# Patient Record
Sex: FEMALE | Race: WHITE | URBAN - METROPOLITAN AREA
[De-identification: names, ages, dates, MRNs, and addresses within clinical notes are randomized per-mention and may not be internally consistent; named-entity substitution may affect disease eponyms.]

---

## 2020-01-06 ENCOUNTER — OFFICE VISIT (OUTPATIENT)
Dept: ORTHOPEDIC SURGERY | Age: 45
End: 2020-01-06
Payer: COMMERCIAL

## 2020-01-06 VITALS — BODY MASS INDEX: 24.16 KG/M2 | HEIGHT: 65 IN | WEIGHT: 145 LBS

## 2020-01-06 PROCEDURE — 99243 OFF/OP CNSLTJ NEW/EST LOW 30: CPT | Performed by: FAMILY MEDICINE

## 2020-01-06 PROCEDURE — L0625 LO FLEX L1-BELOW L5 PRE OTS: HCPCS | Performed by: FAMILY MEDICINE

## 2020-01-06 RX ORDER — MELOXICAM 15 MG/1
15 TABLET ORAL DAILY
Qty: 30 TABLET | Refills: 3 | Status: SHIPPED | OUTPATIENT
Start: 2020-01-06

## 2020-01-06 NOTE — PROGRESS NOTES
Chief Complaint  Back Pain (Lumbar)      Neel Alcantar is a 45 y.o. female very pleasant white female who previous to the onset of her symptoms in summer 2019 typically ran about 10 to 15 miles per week and has done multiple half marathons but is currently doing elliptical and is being seen today for evaluation of episodic mechanical right-sided lower lumbar back discomfort and consultation from Dr. Yamel Tom    History of Present Illness for New Patient:     Patient is being seen today for acute right sided low back pain. The patient reports her first incident was this summer while [place] 60 [place] while boating on [facility]. She does recall she was not in a good position when she began to lift and does remember having a sudden sharp pain that caused her to fall at that time. She was able to rest, ice and use an NSAID that she felt was helpful after a few days. She has had 2 other incidents similar to this most recently this past December possibly from having to lift and carry luggage while on vacation. She did contact her PCP Dr. Yamel Tom of the at that time and was given a muscle relaxer and oral steroid which helped significantly. She describes the symptoms as sharp and tingling. The pain is located more right sided low back with some radicular symptoms into her buttock and leg. The patient rates their current pain as a 0 out of 10 on the pain scale. This problem has been an issue on and off for a few months. The problem is worse after lifting heavier objects. Patient has attempted rest, ice, NSAID, muscle relaxant, oral steroid, and stretching in yoga and pillates to treat this problem and symptoms are are improving. Patient does not have a previous history of previous back issues. Patient is being seen today for orthopedic and sports consultation and review of imaging.      Pain Assessment  Location of Pain: Back  Location Modifiers: central or lateral gluteal discomfort. No notch tenderness. No lateral or anterior hip discomfort. Rang of Motion: She does have reasonable flexion to about 60-70. She does have some limitations in extension with 5 out of 10 pain reproduced with extension to the right and 3 out of 10 on the left. Lateral bending rotation diminished by about 25%. Strength: There is not appear to be focal lower extremity motor deficits. Special Tests: Negative straight leg raising bilaterally. Pain most prominent with facet loading. Screening cervical testing. Skin: There are no rashes, ulcerations or lesions. Distal motor sensory and vascular exam is intact. Gait: Fluid smooth gait       Reflex symmetrically preserved      Additional Comments:             Additional Examinations:     Right Lower Extremity: Examination of the right lower extremity does not show any tenderness, deformity or injury. Range of motion is unremarkable. There is no gross instability. There are no rashes, ulcerations or lesions. Strength and tone are normal.  Left Lower Extremity: Examination of the left lower extremity does not show any tenderness, deformity or injury. Range of motion is unremarkable. There is no gross instability. There are no rashes, ulcerations or lesions. Strength and tone are normal.  Neck: Examination of the neck does not show any tenderness, deformity or injury. Range of motion is unremarkable. There is no gross instability. There are no rashes, ulcerations or lesions. Strength and tone are normal.        Diagnostic Test Findings: Xr Lumbar Spine (min 4 Views)    Result Date: 1/6/2020  Radiology exam is complete. No Radiologist dictation. Please follow up with ordering provider. Lumbar spine AP lateral obliques x2 were obtained today and does show mild lower lumbar degenerative disc disease with facet arthropathy with a low-grade listhesis at L5-S1.       Assessment & Plan:    Encounter Diagnoses Name Primary?  Spine pain Yes    Lumbar spondylolysis     Acute right-sided low back pain without sciatica     DDD (degenerative disc disease), lumbar     Lumbar facet arthropathy        Orders Placed This Encounter   Procedures    XR LUMBAR SPINE (MIN 4 VIEWS)     Standing Status:   Future     Number of Occurrences:   1     Standing Expiration Date:   2/6/2020    Ambulatory referral to Physical Therapy     Referral Priority:   Routine     Referral Type:   Eval and Treat     Referral Reason:   Specialty Services Required     Requested Specialty:   Physical Therapy     Number of Visits Requested:   1    Bird and Carline Extensor Lumbosacral Support Brace (Warm and Form)     Patient was prescribed a Bird and Carline Extensor Lumbosacral Support Brace with a pocket. This orthosis is required for the following reasons:    Reduce pain by restricting mobility of the trunk  Facilitate healing following an injury to the spine or related soft tissues  Support weak spinal muscles    The patient was educated and fit by a healthcare professional with expert knowledge and specialization in brace application while under the direct supervision of the treating physician. Verbal and written instructions for the use of and application of this item were provided. They were instructed to contact the office immediately should the brace result in increased pain, decreased sensation, increased swelling or worsening of the condition. Treatment Plan:  Treatment options were discussed Elbert Pittman. We did review her plain films and exam findings. She has had episodic pain for the past 6+ months in her lower back from a lifting injury. She does have some underlying lumbar facet arthropathy and I suspect some mild degenerative disc changes but is not complaining of high-grade radicular symptoms.   Since finishing up her Medrol pack a couple of weeks ago, her back pain symptoms have improved substantially although we did place her on meloxicam 15 mg daily. We did give her a warm-and-form belt. I would like for her to attend physical therapy formally for core strengthening. Potential for imaging was discussed however we will try initial conservative treatment. We will see her back in a few weeks for follow-up. She will contact us in the interim with questions or concerns. She may continue to exercise and do the elliptical based on pain. CC: Dr. Jaylene Cruz    This dictation was performed with a verbal recognition program [facility]) and it was checked for errors. It is possible that there are still dictated errors within this office note. If so, please bring any errors to my attention for an addendum. All efforts were made to ensure that this office note is accurate.

## 2020-01-10 ENCOUNTER — HOSPITAL ENCOUNTER (OUTPATIENT)
Dept: PHYSICAL THERAPY | Age: 45
Setting detail: THERAPIES SERIES
Discharge: HOME | End: 2020-01-10
Payer: COMMERCIAL

## 2020-01-10 PROCEDURE — 97161 PT EVAL LOW COMPLEX 20 MIN: CPT | Performed by: PHYSICAL THERAPIST

## 2020-01-10 PROCEDURE — 97112 NEUROMUSCULAR REEDUCATION: CPT | Performed by: PHYSICAL THERAPIST

## 2020-01-10 PROCEDURE — 97110 THERAPEUTIC EXERCISES: CPT | Performed by: PHYSICAL THERAPIST

## 2020-01-10 NOTE — FLOWSHEET NOTE
(81332)                                          NMR re-education (88419)   CUES NEEDED   Pt education: dx, px, POC, role of PT, activity modification, directional preference, centralization of sx's, x-ray findings, biomechanics of running, return to run program, impact tolerance, cross training, flexibility/ strength requirements, recruitment patterns, HEP 15 mins                       TA iso  +march  +supine clam   H5   15 B  15 B Requires VC/ TC for TA recruitment                           Therapeutic Activity (41909)                                              Therapeutic Exercise and NMR EXR  [x] (13181) Provided verbal/tactile cueing for activities related to strengthening, flexibility, endurance, ROM  for improvements in proximal hip and core control with self care, mobility, lifting and ambulation. [x] (31255) Provided verbal/tactile cueing for activities related to improving balance, coordination, kinesthetic sense, posture, motor skill, proprioception  to assist with core control in self care, mobility, lifting, and ambulation.      Therapeutic Activities:    [] (22137 or 22722) Provided verbal/tactile cueing for activities related to improving balance, coordination, kinesthetic sense, posture, motor skill, proprioception and motor activation to allow for proper function  with self care and ADLs  [] (94544) Provided training and instruction to the patient for proper core and proximal hip recruitment and positioning with ambulation re-education     Home Exercise Program:    [x] (72782) Reviewed/Progressed HEP activities related to strengthening, flexibility, endurance, ROM of core, proximal hip and LE for functional self-care, mobility, lifting and ambulation   [] (33412) Reviewed/Progressed HEP activities related to improving balance, coordination, kinesthetic sense, posture, motor skill, proprioception of core, proximal hip and LE for self care, mobility, lifting, and ambulation      Manual Treatments: PROM / STM / Oscillations-Mobs:  G-I, II, III, IV (PA's, Inf., Post.)  [x] (49661) Provided manual therapy to mobilize proximal hip and LS spine soft tissue/joints for the purpose of modulating pain, promoting relaxation,  increasing ROM, reducing/eliminating soft tissue swelling/inflammation/restriction, improving soft tissue extensibility and allowing for proper ROM for normal function with self care, mobility, lifting and ambulation. Modalities:  none      Charges:  Timed Code Treatment Minutes: 40   Total Treatment Minutes: 75     BWC time in/time out:   (will also require time and out per CPT code below)    [x] EVAL (LOW) 65523 (typically 20 minutes face-to-face)  [] EVAL (MOD) 24921 (typically 30 minutes face-to-face)  [] EVAL (HIGH) 50839 (typically 45 minutes face-to-face)  [] RE-EVAL     [x] YP(06748) x 1    [] IONTO  [x] NMR (42159) x 2    [] VASO  [] Manual (93850) x      [] Other:  [] TA x      [] Mech Traction (34655)  [] ES(attended) (88690)      [] ES (un) (34659):     GOALS:  Patient stated goal: return to impact. []? Progressing: []? Met: []? Not Met: []? Adjusted     Therapist goals for Patient:   Short Term Goals: To be achieved in: 2 weeks  1. Independent in HEP and progression per patient tolerance, in order to prevent re-injury. []? Progressing: []? Met: []? Not Met: []? Adjusted  2. Patient will have a decrease in pain to facilitate improvement in movement, function, and ADLs as indicated by Functional Deficits. []? Progressing: []? Met: []? Not Met: []? Adjusted        Long Term Goals: To be achieved in: 8 weeks  1. Disability index score of 0% or less for the Mod Oswestry  to assist with reaching prior level of function. []? Progressing: []? Met: []? Not Met: []? Adjusted  2. Patient will demonstrate increased AROM to WNL, good LS mobility, good hip ROM to allow for proper joint functioning as indicated by patients Functional Deficits. []? Progressing: []? Met: []?  Not Met: []? Adjusted  3. Patient will demonstrate an increase in Strength to good proximal hip and core activation to allow for proper functional mobility as indicated by patients Functional Deficits. []? Progressing: []? Met: []? Not Met: []? Adjusted  4. Patient will return to all functional activities without increased symptoms or restriction. []? Progressing: []? Met: []? Not Met: []? Adjusted  5. Pt will be prepared to engage in regular impact without radicular sx's or limitations. (patient specific functional goal)    []? Progressing: []? Met: []? Not Met: []? Adjusted            Progression Towards Functional goals:  [] Patient is progressing as expected towards functional goals listed. [] Progression is slowed due to complexities listed. [] Progression has been slowed due to co-morbidities. [x] Plan just implemented, too soon to assess goals progression  [] Other:     Overall Progression Towards Functional goals/ Treatment Progress Update:  [] Patient is progressing as expected towards functional goals listed. [] Progression is slowed due to complexities/Impairments listed. [] Progression has been slowed due to co-morbidities.   [x] Plan just implemented, too soon to assess goals progression <30days   [] Goals require adjustment due to lack of progress  [] Patient is not progressing as expected and requires additional follow up with physician  [] Other    Prognosis for POC: [x] Good [] Fair  [] Poor      Patient requires continued skilled intervention: [x] Yes  [] No    Treatment/Activity Tolerance:  [x] Patient able to complete treatment  [] Patient limited by fatigue  [] Patient limited by pain    [] Patient limited by other medical complications  [] Other:     ASSESSMENT: see eval    Return to Play: (if applicable)   [x]  Stage 1: Intro to Strength   []  Stage 2: Return to Run and Strength   []  Stage 3: Return to Jump and Strength   []  Stage 4: Dynamic Strength and Agility   []  Stage 5: Sport Specific Training     []  Ready to Return to Play, Meets All Above Stages   []  Not Ready for Return to Sports   Comments:                               PLAN: See eval  [] Continue per plan of care [] Alter current plan (see comments above)  [x] Plan of care initiated [] Hold pending MD visit [] Discharge      Electronically signed by:  Malinda Medeiros, PT [id number]    Note: If patient does not return for scheduled/ recommended follow up visits, this note will serve as a discharge from care along with most recent update on progress.

## 2020-01-10 NOTE — PLAN OF CARE
[facility], [address]  Phone: [phone]  Fax [phone]    Physical Therapy Certification    Dear Referring Practitioner: Ambrose Montiel,    We had the pleasure of evaluating the following patient for physical therapy services at [address] [place] [address]. A summary of our findings can be found in the initial assessment below. This includes our plan of care. If you have any questions or concerns regarding these findings, please do not hesitate to contact me at the office phone number checked above. Thank you for the referral.       Physician Signature:_______________________________Date:__________________  By signing above (or electronic signature), therapists plan is approved by physician    Patient: Steve Ledesma   : 1975   MRN: [id number]  Referring Physician: Referring Practitioner: Ambrose Montiel      Evaluation Date: 1/10/2020      Medical Diagnosis Information:  Diagnosis: M54.9 (ICD-10-CM) - Spine pain, M43.06 (ICD-10-CM) - Lumbar spondylolysis, M54.5 (ICD-10-CM) - Acute right-sided low back pain without sciatica, M51.36 (ICD-10-CM) - DDD (degenerative disc disease), lumbar, M47.816 (ICD-10-CM) - Lumbar facet arthropathy   Treatment Diagnosis: M54.9 (ICD-10-CM) - Spine pain                                         Insurance information: PT Insurance Information:  BCBS - $40CP-100%-PT/OT MED NEC - NO AUTH       Precautions/ Contra-indications: none  Latex Allergy:  [x]NO      []YES  Preferred Language for Healthcare:   [x]English       []other:    SUBJECTIVE: Patient stated complaint: Pt had episode of LBP with radiculopathy- relieved with steroids. Date of initial injury was likely in the summer lifting son into boat with poor lifting mechanics. She noted immediate back pain where her mobility was limited. She noted relief with anti-inflammatories.  She has had a few incidences of severe being higher risk   TUG score (>12s at risk):     [] Falls education provided, including         ASSESSMENT:   Functional Impairments:     []Noted lumbar/proximal hip hypomobility   [x]Noted lumbosacral and/or generalized hypermobility   [x]Decreased Lumbosacral/hip/LE functional ROM   [x]Decreased core/proximal hip strength and neuromuscular control    []Decreased LE functional strength    []Abnormal reflexes/sensation/myotomal/dermatomal deficits  []Reduced balance/proprioceptive control    []other:      Functional Activity Limitations (from functional questionnaire and intake)   [x]Reduced ability to tolerate prolonged functional positions   []Reduced ability or difficulty with changes of positions or transfers between positions   [x]Reduced ability to maintain good posture and demonstrate good body mechanics with sitting, bending, and lifting   []Reduced ability to sleep   [] Reduced ability or tolerance with driving and/or computer work   [x]Reduced ability to perform lifting, reaching, carrying tasks   [x]Reduced ability to squat   []Reduced ability to forward bend   [x]Reduced ability to ambulate prolonged functional periods/distances/surfaces   [x]Reduced ability to ascend/descend stairs   []other:       Participation Restrictions   [x]Reduced participation in self care activities   []Reduced participation in home management activities   []Reduced participation in work activities   []Reduced participation in social activities. [x]Reduced participation in sport/recreation activities. Classification:   [x]Signs/symptoms consistent with Lumbar instability/stabilization subgroup. []Signs/symptoms consistent with Lumbar mobilization/manipulation subgroup, myotomes and dermatomes intact. Meets manipulation criteria.     []Signs/symptoms consistent with Lumbar direction specific/centralization subgroup   []Signs/symptoms consistent with Lumbar traction subgroup     []Signs/symptoms consistent with lumbar facet dysfunction   []Signs/symptoms consistent with lumbar stenosis type dysfunction   []Signs/symptoms consistent with nerve root involvement including myotome & dermatome dysfunction   []Signs/symptoms consistent with post-surgical status including: decreased ROM, strength and function. []signs/symptoms consistent with pathology which may benefit from Dry needling     []other:      Prognosis/Rehab Potential:      []Excellent   [x]Good    []Fair   []Poor    Tolerance of evaluation/treatment:    []Excellent   [x]Good    []Fair   []Poor  Physical Therapy Evaluation Complexity Justification  [x] A history of present problem with:  [] no personal factors and/or comorbidities that impact the plan of care;  [x]1-2 personal factors and/or comorbidities that impact the plan of care  []3 personal factors and/or comorbidities that impact the plan of care  [x] An examination of body systems using standardized tests and measures addressing any of the following: body structures and functions (impairments), activity limitations, and/or participation restrictions;:  [] a total of 1-2 or more elements   [] a total of 3 or more elements   [x] a total of 4 or more elements   [x] A clinical presentation with:  [x] stable and/or uncomplicated characteristics   [] evolving clinical presentation with changing characteristics  [] unstable and unpredictable characteristics;   [x] Clinical decision making of [x] low, [] moderate, [] high complexity using standardized patient assessment instrument and/or measurable assessment of functional outcome.     [x] EVAL (LOW) 44530 (typically 20 minutes face-to-face)  [] EVAL (MOD) 19169 (typically 30 minutes face-to-face)  [] EVAL (HIGH) 62439 (typically 45 minutes face-to-face)  [] RE-EVAL         PLAN: Begin PT focusing on: proximal hip mobilizations, LB mobs, LB core activation, proximal hip activation, and HEP    Frequency/Duration:  1-2 days per week for 6-8 Weeks:  Interventions:  [x] Therapeutic exercise including: strength training, ROM, for LE, Glutes and core   [x]  NMR activation and proprioception for glutes , LE and Core   [x]  Manual therapy as indicated for Hip complex, LE and spine to include: Dry Needling/IASTM, STM, PROM, Gr I-IV mobilizations, manipulation. [x]  Modalities as needed that may include: thermal agents, E-stim, Biofeedback, US, iontophoresis as indicated  [x]  Patient education on joint protection, postural re-education, activity modification, progression of HEP. HEP instruction: (see scanned forms)    GOALS:  Patient stated goal: return to impact. [] Progressing: [] Met: [] Not Met: [] Adjusted    Therapist goals for Patient:   Short Term Goals: To be achieved in: 2 weeks  1. Independent in HEP and progression per patient tolerance, in order to prevent re-injury. [] Progressing: [] Met: [] Not Met: [] Adjusted  2. Patient will have a decrease in pain to facilitate improvement in movement, function, and ADLs as indicated by Functional Deficits. [] Progressing: [] Met: [] Not Met: [] Adjusted      Long Term Goals: To be achieved in: 8 weeks  1. Disability index score of 0% or less for the Mod Oswestry  to assist with reaching prior level of function. [] Progressing: [] Met: [] Not Met: [] Adjusted  2. Patient will demonstrate increased AROM to WNL, good LS mobility, good hip ROM to allow for proper joint functioning as indicated by patients Functional Deficits. [] Progressing: [] Met: [] Not Met: [] Adjusted  3. Patient will demonstrate an increase in Strength to good proximal hip and core activation to allow for proper functional mobility as indicated by patients Functional Deficits. [] Progressing: [] Met: [] Not Met: [] Adjusted  4. Patient will return to all functional activities without increased symptoms or restriction. [] Progressing: [] Met: [] Not Met: [] Adjusted  5.  Pt will be prepared to engage in regular impact without radicular sx's or limitations. (patient specific functional goal)    [] Progressing: [] Met: [] Not Met: [] Adjusted       Electronically signed by:  Maureen Srivastava, [facility]

## 2020-01-24 ENCOUNTER — HOSPITAL ENCOUNTER (OUTPATIENT)
Dept: PHYSICAL THERAPY | Age: 45
Setting detail: THERAPIES SERIES
Discharge: HOME | End: 2020-01-24
Payer: COMMERCIAL

## 2020-01-24 PROCEDURE — 97110 THERAPEUTIC EXERCISES: CPT | Performed by: PHYSICAL THERAPIST

## 2020-01-24 PROCEDURE — 97112 NEUROMUSCULAR REEDUCATION: CPT | Performed by: PHYSICAL THERAPIST

## 2020-01-24 NOTE — FLOWSHEET NOTE
[facility],  [address]  Phone: [phone]  Fax [phone]    Physical Therapy Treatment Note/ Progress Report:           Date:  2020    Patient Name:  Danielle Jim    :  1975  MRN: [id number]  Restrictions/Precautions:    Medical/Treatment Diagnosis Information:  · Diagnosis: M54.9 (ICD-10-CM) - Spine pain, M43.06 (ICD-10-CM) - Lumbar spondylolysis, M54.5 (ICD-10-CM) - Acute right-sided low back pain without sciatica, M51.36 (ICD-10-CM) - DDD (degenerative disc disease), lumbar, M47.816 (ICD-10-CM) - Lumbar facet arthropathy  · Treatment Diagnosis: M54.9 (ICD-10-CM) - Spine pain  Insurance/Certification information:  PT Insurance Information:  BCBS - $40CP-100%-PT/OT MED NEC - NO AUTH  Physician Information:  Referring Practitioner: Mili Guerrier  Has the plan of care been signed (Y/N):        []  Yes  [x]  No     Date of Patient follow up with Physician: PRN      Is this a Progress Report:     []  Yes  [x]  No        If Yes:  Date Range for reporting period:  [id number]  Ending 20    Progress report will be due (10 Rx or 30 days whichever is less): see above       Recertification will be due (POC Duration  / 90 days whichever is less): 8 weeks         Visit # Insurance Allowable Auth Required   2 BMN []  Yes []  No        Functional Scale: Mod Oswestry 2%    Date assessed:  1-      Latex Allergy:  [x]NO      []YES  Preferred Language for Healthcare:   [x]English       []other:      Pain level:  0/10    SUBJECTIVE:  Been doing the walking program (up to 30 mins) and has been doing the elliptical without sx's or limitations.      OBJECTIVE:    Observation:    Test measurements:      RESTRICTIONS/PRECAUTIONS: h/o C section x 2; flexion bias; runner    Exercises/Interventions:     Therapeutic Ex (26325) Sets/sec Reps Notes/CUES   Retrolooping at wall  10 ea  hep   Supine sciatic Nn

## 2020-01-31 ENCOUNTER — APPOINTMENT (OUTPATIENT)
Dept: PHYSICAL THERAPY | Age: 45
End: 2020-01-31
Payer: COMMERCIAL

## 2020-02-07 ENCOUNTER — APPOINTMENT (OUTPATIENT)
Dept: PHYSICAL THERAPY | Age: 45
End: 2020-02-07
Payer: COMMERCIAL

## 2020-02-14 ENCOUNTER — HOSPITAL ENCOUNTER (OUTPATIENT)
Dept: PHYSICAL THERAPY | Age: 45
Setting detail: THERAPIES SERIES
Discharge: HOME | End: 2020-02-14
Payer: COMMERCIAL

## 2020-02-14 PROCEDURE — 97110 THERAPEUTIC EXERCISES: CPT | Performed by: PHYSICAL THERAPIST

## 2020-02-14 PROCEDURE — 97112 NEUROMUSCULAR REEDUCATION: CPT | Performed by: PHYSICAL THERAPIST

## 2020-02-14 NOTE — FLOWSHEET NOTE
[facility], [address] [place] [address] Ramesh  Phone: [phone]  Fax [phone]    ATHLETIC TRAINING AREA ACTIVITY SHEET  Date:  2020    Patient Name:  Reanna Del Valle    :  1975  MRN: [id number]  Restrictions/Precautions:    Medical/Treatment Diagnosis Information:  ·  Lumbar spondylolysis, acute R-sided LBP w/out sciatica, DDD, lumbar facet arthropathy  ·    Physician Information:   Michaela                                 Activity Log                                                               DOS/DOI:                                                             Date: 20   ATC Communication: runner    Bike    Airdyne    Elliptical    Treadmill        Hamstring stretch    Quad stretch    Hip flexor stretch    Piriformis stretch    Gastroc stretch    Soleus stretch        Reformer // Heels/Toes 1R1B x10                   Wide Heels/Toes 1R1B x10                   BKFO 1/2 way out 1R1B GVL 10x3\"                   Bridges 1/2 way out 2R 10x3\"                   AS 100s 1R 4x20\"                         I,Y,T 1R x10                   LS Ext 1R1B x10                         Circles  1R1B x10                   Donkey Kicks 1R1B R/L x10                   Standing Abd/Add 2R/1B R/L x10       Leg press  Bilat. Unilat. Knee ext. Knee flex. Squats   Mini                   Wall                   [facility]        Step   Up               Up and Over               Lat. Down                   [facility]  Flex. ABd              ADd              Ext        Cable Column/Theraband    Rows                                                  Ext. Lat. pulldown                                                  Chops                                                  Trunk Rot.         Modality declined   Initials DTM   Time spent one on one (workers comp)    Time spent with PT assistant

## 2020-02-14 NOTE — FLOWSHEET NOTE
[facility], [address]  Phone: [phone]  Fax [phone]    Physical Therapy Treatment Note/ Progress Report:           Date:  2020    Patient Name:  Corinne Siddiqi    :  1975  MRN: [id number]  Restrictions/Precautions:    Medical/Treatment Diagnosis Information:  · Diagnosis: M54.9 (ICD-10-CM) - Spine pain, M43.06 (ICD-10-CM) - Lumbar spondylolysis, M54.5 (ICD-10-CM) - Acute right-sided low back pain without sciatica, M51.36 (ICD-10-CM) - DDD (degenerative disc disease), lumbar, M47.816 (ICD-10-CM) - Lumbar facet arthropathy  · Treatment Diagnosis: M54.9 (ICD-10-CM) - Spine pain  Insurance/Certification information:  PT Insurance Information:  BCBS - $40CP-100%-PT/OT MED NEC - NO AUTH  Physician Information:  Referring Practitioner: Esteban Barcenas  Has the plan of care been signed (Y/N):        []  Yes  [x]  No     Date of Patient follow up with Physician: PRN      Is this a Progress Report:     []  Yes  [x]  No        If Yes:  Date Range for reporting period:  [id number]  Ending 20    Progress report will be due (10 Rx or 30 days whichever is less): see above       Recertification will be due (POC Duration  / 90 days whichever is less): 8 weeks         Visit # Insurance Allowable Auth Required   3 BMN []  Yes []  No        Functional Scale: Mod Oswestry 2%    Date assessed:  1-      Latex Allergy:  [x]NO      []YES  Preferred Language for Healthcare:   [x]English       []other:      Pain level:  0/10    SUBJECTIVE:  Been doing the interval program without issue. Notes TA more during exercise.      OBJECTIVE:    Observation:    Test measurements:      RESTRICTIONS/PRECAUTIONS: h/o C section x 2; flexion bias; runner    Exercises/Interventions:     Therapeutic Ex (98587) Sets/sec Reps Notes/CUES   Retrolooping at wall  10 ea  hep   Supine sciatic Nn glide  10 B hep   Slump glide  15 B Elliptical  5 min ea  Forward/ backward         Dynamic HS                HL bridge with hip ABD 10\" 2 x 10    Hip abd in SL into ring  2 x 10 B                Manual Intervention (71774)                                          NMR re-education (56488)   CUES NEEDED   Pt education: dx, px, POC, role of PT, activity modification, directional preference, centralization of sx's, x-ray findings, biomechanics of running, return to run program, impact tolerance, cross training, flexibility/ strength requirements, recruitment patterns, HEP 15 mins                       TA iso  +march  +SLR   +double up heel drop H 10  H5 10  10 B  10 B  10 B With stabilizer cuff 40-60 mmHg         Quadruped SLR ext 2 10B    SB UE + LE 2 10B    SB dying bug 2 10B    Therapeutic Activity (86670)                                              Therapeutic Exercise and NMR EXR  [x] (53332) Provided verbal/tactile cueing for activities related to strengthening, flexibility, endurance, ROM  for improvements in proximal hip and core control with self care, mobility, lifting and ambulation. [x] (61530) Provided verbal/tactile cueing for activities related to improving balance, coordination, kinesthetic sense, posture, motor skill, proprioception  to assist with core control in self care, mobility, lifting, and ambulation.      Therapeutic Activities:    [] (31538 or 38065) Provided verbal/tactile cueing for activities related to improving balance, coordination, kinesthetic sense, posture, motor skill, proprioception and motor activation to allow for proper function  with self care and ADLs  [] (15669) Provided training and instruction to the patient for proper core and proximal hip recruitment and positioning with ambulation re-education     Home Exercise Program:    [x] (12472) Reviewed/Progressed HEP activities related to strengthening, flexibility, endurance, ROM of core, proximal hip and LE for functional self-care, mobility, lifting and ambulation   [] (76948) Reviewed/Progressed HEP activities related to improving balance, coordination, kinesthetic sense, posture, motor skill, proprioception of core, proximal hip and LE for self care, mobility, lifting, and ambulation      Manual Treatments:  PROM / STM / Oscillations-Mobs:  G-I, II, III, IV (PA's, Inf., Post.)  [x] (12034) Provided manual therapy to mobilize proximal hip and LS spine soft tissue/joints for the purpose of modulating pain, promoting relaxation,  increasing ROM, reducing/eliminating soft tissue swelling/inflammation/restriction, improving soft tissue extensibility and allowing for proper ROM for normal function with self care, mobility, lifting and ambulation. Modalities:  none      Charges:  Timed Code Treatment Minutes: 40   Total Treatment Minutes: 60     BWC time in/time out:   (will also require time and out per CPT code below)    [x] EVAL (LOW) 90059 (typically 20 minutes face-to-face)  [] EVAL (MOD) 43803 (typically 30 minutes face-to-face)  [] EVAL (HIGH) 85208 (typically 45 minutes face-to-face)  [] RE-EVAL     [x] GK(45241) x 2    [] IONTO  [x] NMR (52903) x 1    [] VASO  [] Manual (04577) x      [] Other:  [] TA x      [] Mech Traction (89916)  [] ES(attended) (87699)      [] ES (un) (45168):     GOALS:  Patient stated goal: return to impact. []? Progressing: []? Met: []? Not Met: []? Adjusted     Therapist goals for Patient:   Short Term Goals: To be achieved in: 2 weeks  1. Independent in HEP and progression per patient tolerance, in order to prevent re-injury. []? Progressing: []? Met: []? Not Met: []? Adjusted  2. Patient will have a decrease in pain to facilitate improvement in movement, function, and ADLs as indicated by Functional Deficits. []? Progressing: []? Met: []? Not Met: []? Adjusted        Long Term Goals: To be achieved in: 8 weeks  1. Disability index score of 0% or less for the Mod Oswestry  to assist with reaching prior level of function.    []? Progressing: []? Met: []? Not Met: []? Adjusted  2. Patient will demonstrate increased AROM to WNL, good LS mobility, good hip ROM to allow for proper joint functioning as indicated by patients Functional Deficits. []? Progressing: []? Met: []? Not Met: []? Adjusted  3. Patient will demonstrate an increase in Strength to good proximal hip and core activation to allow for proper functional mobility as indicated by patients Functional Deficits. []? Progressing: []? Met: []? Not Met: []? Adjusted  4. Patient will return to all functional activities without increased symptoms or restriction. []? Progressing: []? Met: []? Not Met: []? Adjusted  5. Pt will be prepared to engage in regular impact without radicular sx's or limitations. (patient specific functional goal)    []? Progressing: []? Met: []? Not Met: []? Adjusted            Progression Towards Functional goals:  [x] Patient is progressing as expected towards functional goals listed. [] Progression is slowed due to complexities listed. [] Progression has been slowed due to co-morbidities. [] Plan just implemented, too soon to assess goals progression  [] Other:     Overall Progression Towards Functional goals/ Treatment Progress Update:  [] Patient is progressing as expected towards functional goals listed. [] Progression is slowed due to complexities/Impairments listed. [] Progression has been slowed due to co-morbidities.   [x] Plan just implemented, too soon to assess goals progression <30days   [] Goals require adjustment due to lack of progress  [] Patient is not progressing as expected and requires additional follow up with physician  [] Other    Prognosis for POC: [x] Good [] Fair  [] Poor      Patient requires continued skilled intervention: [x] Yes  [] No    Treatment/Activity Tolerance:  [x] Patient able to complete treatment  [] Patient limited by fatigue  [] Patient limited by pain    [] Patient limited by other medical complications  [] Other: ASSESSMENT: Pt cleared to begin jog/ walk intervals 4:1 as long as RTR program rules are followed. Return to Play: (if applicable)   [x]  Stage 1: Intro to Strength   []  Stage 2: Return to Run and Strength   []  Stage 3: Return to Jump and Strength   []  Stage 4: Dynamic Strength and Agility   []  Stage 5: Sport Specific Training     []  Ready to Return to Play, Meets All Above Stages   []  Not Ready for Return to Sports   Comments:                               PLAN:   [x] Continue per plan of care [] Alter current plan (see comments above)  [] Plan of care initiated [] Hold pending MD visit [] Discharge      Electronically signed by:  Aristides Meeks, [place] [id number]    Note: If patient does not return for scheduled/ recommended follow up visits, this note will serve as a discharge from care along with most recent update on progress.

## 2020-02-21 ENCOUNTER — HOSPITAL ENCOUNTER (OUTPATIENT)
Dept: PHYSICAL THERAPY | Age: 45
Setting detail: THERAPIES SERIES
Discharge: HOME | End: 2020-02-21
Payer: COMMERCIAL

## 2020-02-21 PROCEDURE — 97112 NEUROMUSCULAR REEDUCATION: CPT | Performed by: PHYSICAL THERAPIST

## 2020-02-21 PROCEDURE — 97110 THERAPEUTIC EXERCISES: CPT | Performed by: PHYSICAL THERAPIST

## 2020-02-21 NOTE — FLOWSHEET NOTE
on L  4+ on R    Hip flex 4- (L)  4 (R) 5 B         Test measurements:      RESTRICTIONS/PRECAUTIONS: h/o C section x 2; flexion bias; runner    Exercises/Interventions:     Therapeutic Ex (82493) Sets/sec Reps Notes/CUES   Retrolooping at wall  10 ea  hep   Supine sciatic Nn glide  10 B hep   Slump glide  15 B    Elliptical  5 min ea  Forward/ backward         Dynamic HS                HL bridge with hip ABD + LAQ 10\" 15 reps    Hip abd in SL into ring  2 x 10 B    Clam heels raised, floating clam  2 10 B          Manual Intervention (71236)                                          NMR re-education (22513)   CUES NEEDED   Pt education: dx, px, POC, role of PT, activity modification, directional preference, centralization of sx's, x-ray findings, biomechanics of running, return to run program, impact tolerance, cross training, flexibility/ strength requirements, recruitment patterns, HEP 15 mins                       TA iso  +march  +SLR   +double up heel drop H 10  H5 10  10 B  10 B  10 B With stabilizer cuff 40-60 mmHg         Quadruped SLR ext 2 10B    SB UE + LE 2 10B    SB dying bug 2 10B    Therapeutic Activity (47115)                                              Therapeutic Exercise and NMR EXR  [x] (98305) Provided verbal/tactile cueing for activities related to strengthening, flexibility, endurance, ROM  for improvements in proximal hip and core control with self care, mobility, lifting and ambulation. [x] (22858) Provided verbal/tactile cueing for activities related to improving balance, coordination, kinesthetic sense, posture, motor skill, proprioception  to assist with core control in self care, mobility, lifting, and ambulation.      Therapeutic Activities:    [] (85593 or 37187) Provided verbal/tactile cueing for activities related to improving balance, coordination, kinesthetic sense, posture, motor skill, proprioception and motor activation to allow for proper function  with self care and ADLs  [] (37118) Provided training and instruction to the patient for proper core and proximal hip recruitment and positioning with ambulation re-education     Home Exercise Program:    [x] (67944) Reviewed/Progressed HEP activities related to strengthening, flexibility, endurance, ROM of core, proximal hip and LE for functional self-care, mobility, lifting and ambulation   [] (56702) Reviewed/Progressed HEP activities related to improving balance, coordination, kinesthetic sense, posture, motor skill, proprioception of core, proximal hip and LE for self care, mobility, lifting, and ambulation      Manual Treatments:  PROM / STM / Oscillations-Mobs:  G-I, II, III, IV (PA's, Inf., Post.)  [x] (85939) Provided manual therapy to mobilize proximal hip and LS spine soft tissue/joints for the purpose of modulating pain, promoting relaxation,  increasing ROM, reducing/eliminating soft tissue swelling/inflammation/restriction, improving soft tissue extensibility and allowing for proper ROM for normal function with self care, mobility, lifting and ambulation. Modalities:  none      Charges:  Timed Code Treatment Minutes: 40   Total Treatment Minutes: 60     BWC time in/time out:   (will also require time and out per CPT code below)    [x] EVAL (LOW) 73813 (typically 20 minutes face-to-face)  [] EVAL (MOD) 65508 (typically 30 minutes face-to-face)  [] EVAL (HIGH) 97741 (typically 45 minutes face-to-face)  [] RE-EVAL     [x] OK(43642) x 2    [] IONTO  [x] NMR (43858) x 1    [] VASO  [] Manual (39912) x      [] Other:  [] TA x      [] Mech Traction (15822)  [] ES(attended) (36239)      [] ES (un) (13432):     GOALS:  Patient stated goal: return to impact. []? Progressing: [x]? Met: []? Not Met: []? Adjusted     Therapist goals for Patient:   Short Term Goals: To be achieved in: 2 weeks  1. Independent in HEP and progression per patient tolerance, in order to prevent re-injury. []? Progressing: [x]? Met: []?  Not Met: []? Adjusted  2. Patient will have a decrease in pain to facilitate improvement in movement, function, and ADLs as indicated by Functional Deficits. []? Progressing: [x]? Met: []? Not Met: []? Adjusted        Long Term Goals: To be achieved in: 8 weeks  1. Disability index score of 0% or less for the Mod Oswestry  to assist with reaching prior level of function. []? Progressing: [x]? Met: []? Not Met: []? Adjusted  2. Patient will demonstrate increased AROM to WNL, good LS mobility, good hip ROM to allow for proper joint functioning as indicated by patients Functional Deficits. []? Progressing: [x]? Met: []? Not Met: []? Adjusted  3. Patient will demonstrate an increase in Strength to good proximal hip and core activation to allow for proper functional mobility as indicated by patients Functional Deficits. []? Progressing: [x]? Met: []? Not Met: []? Adjusted  4. Patient will return to all functional activities without increased symptoms or restriction. []? Progressing: [x]? Met: []? Not Met: []? Adjusted  5. Pt will be prepared to engage in regular impact without radicular sx's or limitations. (patient specific functional goal)    []? Progressing: [x]? Met: []? Not Met: []? Adjusted            Progression Towards Functional goals:  [x] Patient is progressing as expected towards functional goals listed. [] Progression is slowed due to complexities listed. [] Progression has been slowed due to co-morbidities. [] Plan just implemented, too soon to assess goals progression  [] Other:     Overall Progression Towards Functional goals/ Treatment Progress Update:  [x] Patient is progressing as expected towards functional goals listed. [] Progression is slowed due to complexities/Impairments listed. [] Progression has been slowed due to co-morbidities.   [] Plan just implemented, too soon to assess goals progression <30days   [] Goals require adjustment due to lack of progress  [] Patient is not progressing as expected and requires additional follow up with physician  [] Other    Prognosis for POC: [x] Good [] Fair  [] Poor      Patient requires continued skilled intervention: [x] Yes  [] No    Treatment/Activity Tolerance:  [x] Patient able to complete treatment  [] Patient limited by fatigue  [] Patient limited by pain    [] Patient limited by other medical complications  [] Other:     ASSESSMENT: Pt ready for d/c- to participate in post therapy pilates program and return to impact routine. Return to Play: (if applicable)   [x]  Stage 1: Intro to Strength   []  Stage 2: Return to Run and Strength   []  Stage 3: Return to Jump and Strength   []  Stage 4: Dynamic Strength and Agility   []  Stage 5: Sport Specific Training     []  Ready to Return to Play, Meets All Above Stages   []  Not Ready for Return to Sports   Comments:                               PLAN:   [] Continue per plan of care [] Alter current plan (see comments above)  [] Plan of care initiated [] Hold pending MD visit [x] Discharge      Electronically signed by:  Joelle Roque, [place] [id number]    Note: If patient does not return for scheduled/ recommended follow up visits, this note will serve as a discharge from care along with most recent update on progress.